# Patient Record
Sex: FEMALE | Race: WHITE | URBAN - METROPOLITAN AREA
[De-identification: names, ages, dates, MRNs, and addresses within clinical notes are randomized per-mention and may not be internally consistent; named-entity substitution may affect disease eponyms.]

---

## 2017-12-07 ENCOUNTER — HISTORICAL (OUTPATIENT)
Dept: ADMINISTRATIVE | Facility: HOSPITAL | Age: 52
End: 2017-12-07

## 2017-12-20 ENCOUNTER — HISTORICAL (OUTPATIENT)
Dept: RADIOLOGY | Facility: HOSPITAL | Age: 52
End: 2017-12-20

## 2022-04-09 ENCOUNTER — HISTORICAL (OUTPATIENT)
Dept: ADMINISTRATIVE | Facility: HOSPITAL | Age: 57
End: 2022-04-09

## 2022-04-25 VITALS
DIASTOLIC BLOOD PRESSURE: 69 MMHG | BODY MASS INDEX: 27.39 KG/M2 | SYSTOLIC BLOOD PRESSURE: 107 MMHG | HEIGHT: 63 IN | WEIGHT: 154.56 LBS | OXYGEN SATURATION: 100 %

## 2022-05-02 NOTE — HISTORICAL [facility] CERNER
This is a historical note converted from Joanna. Formatting and pictures may have been removed.  Please reference Joanna for original formatting and attached multimedia. Chief Complaint  right hand. doi around oct 23 2017 on counter at work slipped and fell bent hand back.  History of Present Illness  Patient comes in today for her first visit. ?Patient states she originally injured her?right hand?after climbing on a sink trying to dust off some events in the ceiling. ?She fell back?twisting her right hand.? This was approximately 6 weeks ago.? She states she is having a very difficult time with her?employers at work, she states she tried to go back to work though she cannot anymore due to the severe pain in her right hand. ?She is tried rest medication and bracing without relief.? She denies any previous injuries.?  Physical Exam  Vitals & Measurements  HR:?78?(Peripheral)? BP:?109/67?  HT:?164?cm? HT:?164?cm? WT:?73?kg? WT:?73?kg? BMI:?27.14?  Patient is well-nourished developed female she is awake alert and oriented ?3 she is in no apparent distress she is pleasant and cooperative. ?Examination of the right upper extremity compartments soft and warm. ?Skin is intact. ?Is no signs or symptoms of DVT or infection. ?Examination of the right hand she is tender to palpation about the first webspace she is also very tender?about the first MTP joint.? There is some slight maltracking of her extensor tendon. ?She has difficulty making a full fist she has almost full extension.? She has some mild laxity at the MTP joint as well with stressing.? She is also very tender along the proximal phalanx of the second finger.? She is neurovascularly intact distally. ?X-rays 3 views of the right hand demonstrate no obvious fracture or dislocation.  Assessment/Plan  1.?Sprain of right hand  ? At this time we discussed her physical exam and x-ray findings. ?Patient continues to have severe pain, inability to do daily activities  without?severe pain in her right hand after her accident over 6 weeks ago. ?She has failed conservative treatment. ?We have discussed obtaining an MRI for further evaluation of her right hand, she would like to proceed with this. ?We will set this up at her convenience.? We will hold off on work at this time until?we have a better evaluation, MRI results.? I would like to see her back?next week for her results.  Ordered:  Clinic Follow up, *Est. 12/14/17 3:00:00 CST, Order for future visit, Sprain of right hand  Extensor tendon disruption, [facility] [place]  MRI Ext Upper Joint Right W/O Contrast, Routine, 12/07/17 9:09:00 CST, Other (please specify), None, Ambulatory, mri right hand, Rad Type, Order for future visit, Sprain of right hand  Extensor tendon disruption, Schedule this test, [facility], 12/07/17 9:09:00 CST  Office/Outpatient Visit Level 3 New [id number] PC, Sprain of right hand  Extensor tendon disruption, [facility] [place], 12/07/17 9:09:00 CST  ?  2.?Extensor tendon disruption  Ordered:  Clinic Follow up, *Est. 12/14/17 3:00:00 CST, Order for future visit, Sprain of right hand  Extensor tendon disruption, [facility] [place]  MRI Ext Upper Joint Right W/O Contrast, Routine, 12/07/17 9:09:00 CST, Other (please specify), None, Ambulatory, mri right hand, Rad Type, Order for future visit, Sprain of right hand  Extensor tendon disruption, Schedule this test, [facility], 12/07/17 9:09:00 CST  Office/Outpatient Visit Level 3 New [id number] PC, Sprain of right hand  Extensor tendon disruption, [facility] [place], 12/07/17 9:09:00 CST  ?  Right hand pain  ?   Problem List/Past Medical History  Ongoing  No chronic problems  Historical  Procedure/Surgical History  Gallbladder  Hernia  Hysterectomy  Neck  Medications  Mobic 7.5 mg oral tablet, 7.5 mg= 1 tab(s), Oral, BID, PRN,? ?Not taking  Allergies  No Known Allergies  Social History  Alcohol - 09/20/2017  Past  Substance Abuse -  09/20/2017  Never  Tobacco - 09/20/2017  Never smoker